# Patient Record
Sex: FEMALE | Race: WHITE | Employment: UNEMPLOYED | URBAN - METROPOLITAN AREA
[De-identification: names, ages, dates, MRNs, and addresses within clinical notes are randomized per-mention and may not be internally consistent; named-entity substitution may affect disease eponyms.]

---

## 2024-09-21 ENCOUNTER — HOSPITAL ENCOUNTER (EMERGENCY)
Age: 64
Discharge: HOME | End: 2024-09-22
Attending: EMERGENCY MEDICINE
Payer: MEDICAID

## 2024-09-21 ENCOUNTER — APPOINTMENT (OUTPATIENT)
Dept: GENERAL RADIOLOGY | Age: 64
End: 2024-09-21
Payer: MEDICAID

## 2024-09-21 DIAGNOSIS — S82.842A BIMALLEOLAR ANKLE FRACTURE, LEFT, CLOSED, INITIAL ENCOUNTER: Primary | ICD-10-CM

## 2024-09-21 PROCEDURE — 99284 EMERGENCY DEPT VISIT MOD MDM: CPT

## 2024-09-21 PROCEDURE — 6360000002 HC RX W HCPCS

## 2024-09-21 PROCEDURE — 73600 X-RAY EXAM OF ANKLE: CPT

## 2024-09-21 PROCEDURE — 96372 THER/PROPH/DIAG INJ SC/IM: CPT

## 2024-09-21 RX ADMIN — HYDROMORPHONE HYDROCHLORIDE 0.5 MG: 1 INJECTION, SOLUTION INTRAMUSCULAR; INTRAVENOUS; SUBCUTANEOUS at 23:19

## 2024-09-21 ASSESSMENT — LIFESTYLE VARIABLES
HOW MANY STANDARD DRINKS CONTAINING ALCOHOL DO YOU HAVE ON A TYPICAL DAY: PATIENT DOES NOT DRINK
HOW OFTEN DO YOU HAVE A DRINK CONTAINING ALCOHOL: NEVER

## 2024-09-21 ASSESSMENT — PAIN SCALES - GENERAL
PAINLEVEL_OUTOF10: 0
PAINLEVEL_OUTOF10: 4

## 2024-09-21 ASSESSMENT — PAIN - FUNCTIONAL ASSESSMENT: PAIN_FUNCTIONAL_ASSESSMENT: 0-10

## 2024-09-21 ASSESSMENT — PAIN DESCRIPTION - ORIENTATION: ORIENTATION: RIGHT

## 2024-09-21 ASSESSMENT — PAIN DESCRIPTION - LOCATION: LOCATION: ANKLE

## 2024-09-22 ENCOUNTER — APPOINTMENT (OUTPATIENT)
Dept: GENERAL RADIOLOGY | Age: 64
End: 2024-09-22
Payer: MEDICAID

## 2024-09-22 ENCOUNTER — APPOINTMENT (OUTPATIENT)
Dept: CT IMAGING | Age: 64
End: 2024-09-22
Payer: MEDICAID

## 2024-09-22 VITALS
DIASTOLIC BLOOD PRESSURE: 85 MMHG | SYSTOLIC BLOOD PRESSURE: 128 MMHG | HEART RATE: 89 BPM | OXYGEN SATURATION: 91 % | WEIGHT: 174 LBS | TEMPERATURE: 97.4 F | HEIGHT: 62 IN | RESPIRATION RATE: 16 BRPM | BODY MASS INDEX: 32.02 KG/M2

## 2024-09-22 LAB
25(OH)D3 SERPL-MCNC: 15.6 NG/ML (ref 30–100)
PREALB SERPL-MCNC: 20.2 MG/DL (ref 20–40)

## 2024-09-22 PROCEDURE — 82306 VITAMIN D 25 HYDROXY: CPT

## 2024-09-22 PROCEDURE — 6370000000 HC RX 637 (ALT 250 FOR IP)

## 2024-09-22 PROCEDURE — 73610 X-RAY EXAM OF ANKLE: CPT

## 2024-09-22 PROCEDURE — 73700 CT LOWER EXTREMITY W/O DYE: CPT

## 2024-09-22 PROCEDURE — 36415 COLL VENOUS BLD VENIPUNCTURE: CPT

## 2024-09-22 PROCEDURE — 2500000003 HC RX 250 WO HCPCS

## 2024-09-22 PROCEDURE — 84134 ASSAY OF PREALBUMIN: CPT

## 2024-09-22 RX ORDER — OXYCODONE AND ACETAMINOPHEN 5; 325 MG/1; MG/1
1 TABLET ORAL EVERY 6 HOURS PRN
Qty: 28 TABLET | Refills: 0 | Status: ON HOLD | OUTPATIENT
Start: 2024-09-22 | End: 2024-09-27

## 2024-09-22 RX ORDER — OXYCODONE AND ACETAMINOPHEN 5; 325 MG/1; MG/1
1 TABLET ORAL EVERY 6 HOURS PRN
Qty: 28 TABLET | Refills: 0 | Status: SHIPPED | OUTPATIENT
Start: 2024-09-22 | End: 2024-09-22

## 2024-09-22 RX ORDER — CELECOXIB 200 MG/1
200 CAPSULE ORAL 2 TIMES DAILY
Qty: 180 CAPSULE | Refills: 1 | Status: SHIPPED | OUTPATIENT
Start: 2024-09-22

## 2024-09-22 RX ORDER — ONDANSETRON 4 MG/1
4 TABLET, ORALLY DISINTEGRATING ORAL ONCE
Status: COMPLETED | OUTPATIENT
Start: 2024-09-22 | End: 2024-09-22

## 2024-09-22 RX ORDER — CELECOXIB 200 MG/1
200 CAPSULE ORAL 2 TIMES DAILY
Qty: 180 CAPSULE | Refills: 1 | Status: SHIPPED | OUTPATIENT
Start: 2024-09-22 | End: 2024-09-22

## 2024-09-22 RX ORDER — LIDOCAINE HYDROCHLORIDE 10 MG/ML
20 INJECTION, SOLUTION EPIDURAL; INFILTRATION; INTRACAUDAL; PERINEURAL ONCE
Status: COMPLETED | OUTPATIENT
Start: 2024-09-22 | End: 2024-09-22

## 2024-09-22 RX ADMIN — ONDANSETRON 4 MG: 4 TABLET, ORALLY DISINTEGRATING ORAL at 00:31

## 2024-09-22 RX ADMIN — LIDOCAINE HYDROCHLORIDE 20 ML: 10 INJECTION, SOLUTION EPIDURAL; INFILTRATION; INTRACAUDAL; PERINEURAL at 01:55

## 2024-09-22 ASSESSMENT — PAIN SCALES - GENERAL: PAINLEVEL_OUTOF10: 4

## 2024-09-24 ENCOUNTER — OFFICE VISIT (OUTPATIENT)
Dept: PODIATRY | Age: 64
End: 2024-09-24
Payer: MEDICAID

## 2024-09-24 VITALS — BODY MASS INDEX: 32.02 KG/M2 | WEIGHT: 174 LBS | HEIGHT: 62 IN

## 2024-09-24 DIAGNOSIS — M79.604 PAIN IN BOTH LOWER EXTREMITIES: ICD-10-CM

## 2024-09-24 DIAGNOSIS — M79.605 PAIN IN BOTH LOWER EXTREMITIES: ICD-10-CM

## 2024-09-24 DIAGNOSIS — R60.0 EDEMA OF LOWER EXTREMITY: ICD-10-CM

## 2024-09-24 DIAGNOSIS — S82.841A CLOSED BIMALLEOLAR FRACTURE OF RIGHT ANKLE, INITIAL ENCOUNTER: Primary | ICD-10-CM

## 2024-09-24 PROCEDURE — 1123F ACP DISCUSS/DSCN MKR DOCD: CPT | Performed by: PODIATRIST

## 2024-09-24 PROCEDURE — 99204 OFFICE O/P NEW MOD 45 MIN: CPT | Performed by: PODIATRIST

## 2024-09-24 NOTE — PROGRESS NOTES
[facility] [place] PHYSICIANS [facility] PODIATRY  [address] [place], [place] [address]  Dept: [phone]    NEW PATIENT PROGRESS NOTE  Date of patient's visit: 9/24/2024  Patient's Name:  Shirin Hua YOB: 1960            No care team member to display        Chief Complaint   Patient presents with    New Patient    Ankle Injury     Right ankle fx         HPI:   Shirin Hua is a 64 y.o. female who presents to the office today complaining of right ankle fracture.  Symptoms began 3 day(s) ago. Patient relates pain is Present.  Pain is rated 8 out of 10 and is described as constant.  Treatments prior to today's visit include: visit to [facility] emergency room,splint.  Currently denies F/C/N/V. Pt's primary care physician is No primary care provider on file. last seen patient does not have a pcp at this time.     Allergies   Allergen Reactions    Morphine Palpitations       No past medical history on file.    Prior to Admission medications    Medication Sig Start Date End Date Taking? Authorizing Provider   oxyCODONE-acetaminophen (PERCOCET) 5-325 MG per tablet Take 1 tablet by mouth every 6 hours as needed for Pain for up to 7 days. Intended supply: 7 days. Take lowest dose possible to manage pain Max Daily Amount: 4 tablets 9/22/24 9/29/24 Yes Rammounjenifer, Adnan A, DPM   celecoxib (CELEBREX) 200 MG capsule Take 1 capsule by mouth 2 times daily 9/22/24  Yes Christine Adnan A, DPM       No past surgical history on file.    No family history on file.    Social History     Tobacco Use    Smoking status: Former     Types: Cigarettes    Smokeless tobacco: Never   Substance Use Topics    Alcohol use: Not on file       Review of Systems    Review of Systems:   History obtained from chart review and the patient  General ROS: negative for - chills, fatigue, fever, night sweats or weight gain  Constitutional: Negative for chills, diaphoresis, fatigue, fever and

## 2024-09-26 ENCOUNTER — ANESTHESIA EVENT (OUTPATIENT)
Dept: OPERATING ROOM | Age: 64
End: 2024-09-26

## 2024-09-26 RX ORDER — SCOLOPAMINE TRANSDERMAL SYSTEM 1 MG/1
1 PATCH, EXTENDED RELEASE TRANSDERMAL
Status: CANCELLED | OUTPATIENT
Start: 2024-09-26 | End: 2024-09-29

## 2024-09-27 ENCOUNTER — ANESTHESIA (OUTPATIENT)
Dept: OPERATING ROOM | Age: 64
End: 2024-09-27

## 2024-09-27 ENCOUNTER — HOSPITAL ENCOUNTER (OUTPATIENT)
Age: 64
Setting detail: OUTPATIENT SURGERY
Discharge: HOME | End: 2024-09-27
Attending: PODIATRIST | Admitting: PODIATRIST
Payer: MEDICAID

## 2024-09-27 ENCOUNTER — APPOINTMENT (OUTPATIENT)
Dept: GENERAL RADIOLOGY | Age: 64
End: 2024-09-27
Attending: PODIATRIST
Payer: MEDICAID

## 2024-09-27 VITALS
HEIGHT: 62 IN | WEIGHT: 174 LBS | DIASTOLIC BLOOD PRESSURE: 92 MMHG | BODY MASS INDEX: 32.02 KG/M2 | OXYGEN SATURATION: 91 % | TEMPERATURE: 97.3 F | HEART RATE: 91 BPM | RESPIRATION RATE: 14 BRPM | SYSTOLIC BLOOD PRESSURE: 136 MMHG

## 2024-09-27 DIAGNOSIS — S82.842A BIMALLEOLAR ANKLE FRACTURE, LEFT, CLOSED, INITIAL ENCOUNTER: ICD-10-CM

## 2024-09-27 PROCEDURE — 73610 X-RAY EXAM OF ANKLE: CPT

## 2024-09-27 PROCEDURE — 7100000011 HC PHASE II RECOVERY - ADDTL 15 MIN: Performed by: PODIATRIST

## 2024-09-27 PROCEDURE — 6360000002 HC RX W HCPCS: Performed by: ANESTHESIOLOGY

## 2024-09-27 PROCEDURE — 6360000002 HC RX W HCPCS: Performed by: NURSE ANESTHETIST, CERTIFIED REGISTERED

## 2024-09-27 PROCEDURE — 3600000005 HC SURGERY LEVEL 5 BASE: Performed by: PODIATRIST

## 2024-09-27 PROCEDURE — 2780000010 HC IMPLANT OTHER: Performed by: PODIATRIST

## 2024-09-27 PROCEDURE — 2580000003 HC RX 258: Performed by: NURSE ANESTHETIST, CERTIFIED REGISTERED

## 2024-09-27 PROCEDURE — 2720000010 HC SURG SUPPLY STERILE: Performed by: PODIATRIST

## 2024-09-27 PROCEDURE — 76942 ECHO GUIDE FOR BIOPSY: CPT | Performed by: STUDENT IN AN ORGANIZED HEALTH CARE EDUCATION/TRAINING PROGRAM

## 2024-09-27 PROCEDURE — 6370000000 HC RX 637 (ALT 250 FOR IP)

## 2024-09-27 PROCEDURE — 3700000001 HC ADD 15 MINUTES (ANESTHESIA): Performed by: PODIATRIST

## 2024-09-27 PROCEDURE — 3600000015 HC SURGERY LEVEL 5 ADDTL 15MIN: Performed by: PODIATRIST

## 2024-09-27 PROCEDURE — 2709999900 HC NON-CHARGEABLE SUPPLY: Performed by: PODIATRIST

## 2024-09-27 PROCEDURE — C1713 ANCHOR/SCREW BN/BN,TIS/BN: HCPCS | Performed by: PODIATRIST

## 2024-09-27 PROCEDURE — 7100000000 HC PACU RECOVERY - FIRST 15 MIN: Performed by: PODIATRIST

## 2024-09-27 PROCEDURE — 7100000001 HC PACU RECOVERY - ADDTL 15 MIN: Performed by: PODIATRIST

## 2024-09-27 PROCEDURE — 6360000002 HC RX W HCPCS: Performed by: STUDENT IN AN ORGANIZED HEALTH CARE EDUCATION/TRAINING PROGRAM

## 2024-09-27 PROCEDURE — 27814 TREATMENT OF ANKLE FRACTURE: CPT | Performed by: PODIATRIST

## 2024-09-27 PROCEDURE — 3700000000 HC ANESTHESIA ATTENDED CARE: Performed by: PODIATRIST

## 2024-09-27 PROCEDURE — 7100000010 HC PHASE II RECOVERY - FIRST 15 MIN: Performed by: PODIATRIST

## 2024-09-27 PROCEDURE — 2500000003 HC RX 250 WO HCPCS: Performed by: NURSE ANESTHETIST, CERTIFIED REGISTERED

## 2024-09-27 DEVICE — DISTAL LATERAL FIBULA PLATE, 4 HOLE
Type: IMPLANTABLE DEVICE | Site: ANKLE | Status: FUNCTIONAL
Brand: VARIAX

## 2024-09-27 DEVICE — LOCKING SCREW
Type: IMPLANTABLE DEVICE | Site: ANKLE | Status: FUNCTIONAL
Brand: VARIAX

## 2024-09-27 DEVICE — CANNULATED SCREW
Type: IMPLANTABLE DEVICE | Site: ANKLE | Status: FUNCTIONAL
Brand: ASNIS

## 2024-09-27 RX ORDER — SODIUM CHLORIDE 0.9 % (FLUSH) 0.9 %
5-40 SYRINGE (ML) INJECTION EVERY 12 HOURS SCHEDULED
Status: DISCONTINUED | OUTPATIENT
Start: 2024-09-27 | End: 2024-09-27 | Stop reason: HOSPADM

## 2024-09-27 RX ORDER — DIPHENHYDRAMINE HYDROCHLORIDE 50 MG/ML
INJECTION INTRAMUSCULAR; INTRAVENOUS
Status: DISCONTINUED | OUTPATIENT
Start: 2024-09-27 | End: 2024-09-27 | Stop reason: SDUPTHER

## 2024-09-27 RX ORDER — DEXAMETHASONE SODIUM PHOSPHATE 10 MG/ML
INJECTION, SOLUTION INTRAMUSCULAR; INTRAVENOUS
Status: DISCONTINUED | OUTPATIENT
Start: 2024-09-27 | End: 2024-09-27 | Stop reason: SDUPTHER

## 2024-09-27 RX ORDER — ROCURONIUM BROMIDE 10 MG/ML
INJECTION, SOLUTION INTRAVENOUS
Status: DISCONTINUED | OUTPATIENT
Start: 2024-09-27 | End: 2024-09-27 | Stop reason: SDUPTHER

## 2024-09-27 RX ORDER — SODIUM CHLORIDE 0.9 % (FLUSH) 0.9 %
5-40 SYRINGE (ML) INJECTION PRN
Status: DISCONTINUED | OUTPATIENT
Start: 2024-09-27 | End: 2024-09-27 | Stop reason: HOSPADM

## 2024-09-27 RX ORDER — SODIUM CHLORIDE 9 MG/ML
INJECTION, SOLUTION INTRAVENOUS PRN
Status: DISCONTINUED | OUTPATIENT
Start: 2024-09-27 | End: 2024-09-27 | Stop reason: HOSPADM

## 2024-09-27 RX ORDER — PROPOFOL 10 MG/ML
INJECTION, EMULSION INTRAVENOUS
Status: DISCONTINUED | OUTPATIENT
Start: 2024-09-27 | End: 2024-09-27 | Stop reason: SDUPTHER

## 2024-09-27 RX ORDER — SCOLOPAMINE TRANSDERMAL SYSTEM 1 MG/1
PATCH, EXTENDED RELEASE TRANSDERMAL
Status: COMPLETED
Start: 2024-09-27 | End: 2024-09-27

## 2024-09-27 RX ORDER — BUPIVACAINE HYDROCHLORIDE 5 MG/ML
INJECTION, SOLUTION EPIDURAL; INTRACAUDAL
Status: COMPLETED | OUTPATIENT
Start: 2024-09-27 | End: 2024-09-27

## 2024-09-27 RX ORDER — MIDAZOLAM HYDROCHLORIDE 2 MG/2ML
2 INJECTION, SOLUTION INTRAMUSCULAR; INTRAVENOUS
Status: COMPLETED | OUTPATIENT
Start: 2024-09-27 | End: 2024-09-27

## 2024-09-27 RX ORDER — OXYCODONE AND ACETAMINOPHEN 5; 325 MG/1; MG/1
1 TABLET ORAL ONCE
Status: CANCELLED | OUTPATIENT
Start: 2024-09-27

## 2024-09-27 RX ORDER — CEFAZOLIN SODIUM 2 G/50ML
SOLUTION INTRAVENOUS
Status: DISCONTINUED | OUTPATIENT
Start: 2024-09-27 | End: 2024-09-27 | Stop reason: SDUPTHER

## 2024-09-27 RX ORDER — LABETALOL HYDROCHLORIDE 5 MG/ML
10 INJECTION, SOLUTION INTRAVENOUS
Status: DISCONTINUED | OUTPATIENT
Start: 2024-09-27 | End: 2024-09-27 | Stop reason: HOSPADM

## 2024-09-27 RX ORDER — PHENYLEPHRINE HCL IN 0.9% NACL 1 MG/10 ML
SYRINGE (ML) INTRAVENOUS
Status: DISCONTINUED | OUTPATIENT
Start: 2024-09-27 | End: 2024-09-27 | Stop reason: SDUPTHER

## 2024-09-27 RX ORDER — NALOXONE HYDROCHLORIDE 0.4 MG/ML
INJECTION, SOLUTION INTRAMUSCULAR; INTRAVENOUS; SUBCUTANEOUS PRN
Status: DISCONTINUED | OUTPATIENT
Start: 2024-09-27 | End: 2024-09-27 | Stop reason: HOSPADM

## 2024-09-27 RX ORDER — ONDANSETRON 2 MG/ML
INJECTION INTRAMUSCULAR; INTRAVENOUS
Status: DISCONTINUED | OUTPATIENT
Start: 2024-09-27 | End: 2024-09-27 | Stop reason: SDUPTHER

## 2024-09-27 RX ORDER — OXYCODONE AND ACETAMINOPHEN 5; 325 MG/1; MG/1
TABLET ORAL
Status: COMPLETED
Start: 2024-09-27 | End: 2024-09-27

## 2024-09-27 RX ORDER — LIDOCAINE HYDROCHLORIDE 10 MG/ML
INJECTION, SOLUTION EPIDURAL; INFILTRATION; INTRACAUDAL; PERINEURAL
Status: DISCONTINUED | OUTPATIENT
Start: 2024-09-27 | End: 2024-09-27 | Stop reason: SDUPTHER

## 2024-09-27 RX ORDER — FENTANYL CITRATE 50 UG/ML
INJECTION, SOLUTION INTRAMUSCULAR; INTRAVENOUS
Status: DISCONTINUED | OUTPATIENT
Start: 2024-09-27 | End: 2024-09-27 | Stop reason: SDUPTHER

## 2024-09-27 RX ORDER — CEFAZOLIN 2 G/1
INJECTION, POWDER, FOR SOLUTION INTRAMUSCULAR; INTRAVENOUS
Status: DISCONTINUED
Start: 2024-09-27 | End: 2024-09-27 | Stop reason: HOSPADM

## 2024-09-27 RX ORDER — LIDOCAINE HYDROCHLORIDE 10 MG/ML
INJECTION, SOLUTION INFILTRATION; PERINEURAL
Status: COMPLETED
Start: 2024-09-27 | End: 2024-09-27

## 2024-09-27 RX ORDER — PROCHLORPERAZINE EDISYLATE 5 MG/ML
5 INJECTION INTRAMUSCULAR; INTRAVENOUS
Status: COMPLETED | OUTPATIENT
Start: 2024-09-27 | End: 2024-09-27

## 2024-09-27 RX ORDER — FENTANYL CITRATE 50 UG/ML
100 INJECTION, SOLUTION INTRAMUSCULAR; INTRAVENOUS
Status: COMPLETED | OUTPATIENT
Start: 2024-09-27 | End: 2024-09-27

## 2024-09-27 RX ORDER — SODIUM CHLORIDE, SODIUM LACTATE, POTASSIUM CHLORIDE, CALCIUM CHLORIDE 600; 310; 30; 20 MG/100ML; MG/100ML; MG/100ML; MG/100ML
INJECTION, SOLUTION INTRAVENOUS
Status: DISCONTINUED | OUTPATIENT
Start: 2024-09-27 | End: 2024-09-27 | Stop reason: SDUPTHER

## 2024-09-27 RX ORDER — HYDRALAZINE HYDROCHLORIDE 20 MG/ML
10 INJECTION INTRAMUSCULAR; INTRAVENOUS
Status: DISCONTINUED | OUTPATIENT
Start: 2024-09-27 | End: 2024-09-27 | Stop reason: HOSPADM

## 2024-09-27 RX ORDER — OXYCODONE AND ACETAMINOPHEN 5; 325 MG/1; MG/1
1 TABLET ORAL EVERY 6 HOURS PRN
Qty: 20 TABLET | Refills: 0 | Status: SHIPPED | OUTPATIENT
Start: 2024-09-27 | End: 2024-10-02

## 2024-09-27 RX ORDER — BUPIVACAINE HYDROCHLORIDE 5 MG/ML
INJECTION, SOLUTION EPIDURAL; INTRACAUDAL
Status: COMPLETED
Start: 2024-09-27 | End: 2024-09-27

## 2024-09-27 RX ADMIN — OXYCODONE HYDROCHLORIDE AND ACETAMINOPHEN 1 TABLET: 5; 325 TABLET ORAL at 13:59

## 2024-09-27 RX ADMIN — SODIUM CHLORIDE, POTASSIUM CHLORIDE, SODIUM LACTATE AND CALCIUM CHLORIDE: 600; 310; 30; 20 INJECTION, SOLUTION INTRAVENOUS at 11:18

## 2024-09-27 RX ADMIN — ROCURONIUM BROMIDE 40 MG: 10 INJECTION, SOLUTION INTRAVENOUS at 11:21

## 2024-09-27 RX ADMIN — MIDAZOLAM HYDROCHLORIDE 2 MG: 1 INJECTION, SOLUTION INTRAMUSCULAR; INTRAVENOUS at 10:02

## 2024-09-27 RX ADMIN — Medication 12.5 MG: at 12:30

## 2024-09-27 RX ADMIN — PROPOFOL 200 MG: 10 INJECTION, EMULSION INTRAVENOUS at 11:21

## 2024-09-27 RX ADMIN — CEFAZOLIN SODIUM 2000 MG: 2 SOLUTION INTRAVENOUS at 11:24

## 2024-09-27 RX ADMIN — FENTANYL CITRATE 100 MCG: 50 INJECTION, SOLUTION INTRAMUSCULAR; INTRAVENOUS at 10:02

## 2024-09-27 RX ADMIN — LIDOCAINE HYDROCHLORIDE 50 MG: 10 INJECTION, SOLUTION EPIDURAL; INFILTRATION; INTRACAUDAL; PERINEURAL at 11:21

## 2024-09-27 RX ADMIN — Medication 100 MCG: at 11:34

## 2024-09-27 RX ADMIN — SODIUM CHLORIDE, POTASSIUM CHLORIDE, SODIUM LACTATE AND CALCIUM CHLORIDE: 600; 310; 30; 20 INJECTION, SOLUTION INTRAVENOUS at 12:51

## 2024-09-27 RX ADMIN — FENTANYL CITRATE 100 MCG: 50 INJECTION, SOLUTION INTRAMUSCULAR; INTRAVENOUS at 11:20

## 2024-09-27 RX ADMIN — BUPIVACAINE HYDROCHLORIDE 10 ML: 5 INJECTION, SOLUTION EPIDURAL; INTRACAUDAL at 10:09

## 2024-09-27 RX ADMIN — DEXAMETHASONE SODIUM PHOSPHATE 10 MG: 10 INJECTION, SOLUTION INTRAMUSCULAR; INTRAVENOUS at 11:35

## 2024-09-27 RX ADMIN — ONDANSETRON 4 MG: 2 INJECTION INTRAMUSCULAR; INTRAVENOUS at 12:30

## 2024-09-27 RX ADMIN — BUPIVACAINE HYDROCHLORIDE 20 ML: 5 INJECTION, SOLUTION EPIDURAL; INTRACAUDAL; PERINEURAL at 10:05

## 2024-09-27 RX ADMIN — PROCHLORPERAZINE EDISYLATE 5 MG: 5 INJECTION INTRAMUSCULAR; INTRAVENOUS at 13:06

## 2024-09-27 RX ADMIN — Medication 200 MCG: at 11:31

## 2024-09-27 ASSESSMENT — PAIN DESCRIPTION - DESCRIPTORS
DESCRIPTORS: ACHING
DESCRIPTORS: BURNING;STABBING

## 2024-09-27 ASSESSMENT — PAIN DESCRIPTION - LOCATION
LOCATION: ANKLE

## 2024-09-27 ASSESSMENT — PAIN DESCRIPTION - ORIENTATION
ORIENTATION: RIGHT

## 2024-09-27 ASSESSMENT — PAIN DESCRIPTION - PAIN TYPE
TYPE: SURGICAL PAIN

## 2024-09-27 ASSESSMENT — PAIN SCALES - GENERAL
PAINLEVEL_OUTOF10: 9
PAINLEVEL_OUTOF10: 7
PAINLEVEL_OUTOF10: 1
PAINLEVEL_OUTOF10: 9
PAINLEVEL_OUTOF10: 6

## 2024-09-27 ASSESSMENT — PAIN - FUNCTIONAL ASSESSMENT
PAIN_FUNCTIONAL_ASSESSMENT: NONE - DENIES PAIN
PAIN_FUNCTIONAL_ASSESSMENT: 0-10
PAIN_FUNCTIONAL_ASSESSMENT: PREVENTS OR INTERFERES WITH MANY ACTIVE NOT PASSIVE ACTIVITIES

## 2024-09-27 ASSESSMENT — COPD QUESTIONNAIRES: CAT_SEVERITY: MILD

## 2024-09-27 NOTE — OP NOTE
PODIATRY OP NOTE    PATIENT NAME: Shirin Hua  YOB: 1960  -  64 y.o. female  MRN: [id number]  DATE: 9/27/2024  BILLING #: [id number]    Surgeon(s):  Nikolay Martínez DPM     ASSISTANTS: FLORESITA Andrade DPM    PRE-OP DIAGNOSIS:   Closed nondisplaced bimalleolar fracture, right ankle    POST-OP DIAGNOSIS: Same as above.    PROCEDURE:   Open reduction internal fixation of fibula, right  Open reduction internal fixation of tibia, right    ANESTHESIA: General and popliteal block    HEMOSTASIS: Pneumatic tourniquet to right thigh@300 mmHg for 70 minutes.    ESTIMATED BLOOD LOSS: Minimal    MATERIALS:   Implant Name Type Inv. Item Serial No.  Lot No. LRB No. Used Action   [id number] Dart Fire Edge Cannulated Screw     [id number] Right 1 Implanted   [id number] DartFire Edge Cannulated Scerw     [id number] Right 1 Implanted   SCREW BNE L22MM DIA3MM THRD L5MM NONSTERILE MARY GURJIT HND FT - [id number]  SCREW BNE L22MM DIA3MM THRD L5MM NONSTERILE MARY GURJIT HND FT  [employer] HOW-  Right 1 Implanted   SCREW BNE L14MM DIA3.5MM CANC TI ROSAS FULL THRD T10 DRV FOR - [id number]  SCREW BNE L14MM DIA3.5MM CANC TI ROSAS FULL THRD T10 DRV FOR  [employer] HOW-  Right 5 Implanted   SCREW BNE L16MM DIA3.5MM GURJIT TI ROSAS FULL THRD T10 DRV FOR - [id number]  SCREW BNE L16MM DIA3.5MM GURJIT TI ROSAS FULL THRD T10 DRV FOR  [employer] HOW-  Right 1 Implanted   PLATE BNE L89MM THK1.3MM-2MM 4 H LAT DST FIBULAR TI STR ROSAS - [id number]  PLATE BNE L89MM THK1.3MM-2MM 4 H LAT DST FIBULAR TI STR ROSAS  [employer] HOW-  Right 1 Implanted       INJECTABLES: None    SPECIMEN:   * No specimens in log *    COMPLICATIONS: None    FINDINGS: Open reduction with internal fixation of right ankle distal fibula and medial malleolar fractures.  Stress testing for disruption of the syndesmosis was negative.    INDICATIONS FOR PROCEDURE: The patient is a 64-year-old female who presented

## 2024-09-27 NOTE — H&P
Foot and Ankle SURGERY CONSULTATION  [facility]        PATIENT NAME: Shirin Hua   YOB: 1960  GENDER: female     Procedure Date: 9/27/2024  8:54 AM     Attending Provider: Nikolay Martínez DPM        Chief Complaint: right ankle pain     Procedure Scheduled: right ankle sera orif with possible syndesmosis fixation     History of present Illness:  The patient is a 64 y.o. female  who presents to pre-op area prior to scheduled area with right ankle fracture.      Pt last seen in office on 9/24/2024 w/ c/o right ankle pan .   Pt recommended to undergo right ankle ORIF  at earliest possible convenience.    Pt denies changes to his medical history since he was last seen in office. Denies current nausea, vomiting, chest pain, SOB, fever, and chills.     Consent form signed in office reviewed w/ pt. Any/all additional questions/concerns were addressed and consent form updated w/ current date.  Pt elected to pursue the above as scheduled for today.     Past Medical History:  has no past medical history on file.    Past Surgical History:   Past Surgical History:   Procedure Laterality Date    TUBAL LIGATION         Social History:  reports that she has quit smoking. Her smoking use included cigarettes. She has never used smokeless tobacco. She reports that she does not currently use alcohol.    Family History: family history is not on file.    Review of Systems:   Negative except as listed above    Allergies: Morphine    Current Meds:  Current Facility-Administered Medications:     sodium chloride flush 0.9 % injection 5-40 mL, 5-40 mL, IntraVENous, 2 times per day, Daniela Elizondo MD    sodium chloride flush 0.9 % injection 5-40 mL, 5-40 mL, IntraVENous, PRN, Daniela Elizondo MD    0.9 % sodium chloride infusion, , IntraVENous, PRN, Daniela Elizondo MD    ceFAZolin (ANCEF) 2 g injection, , , ,     Vital Signs:  Vitals:    09/27/24 1024   BP: (!) 149/98   Pulse: 99   Resp: 20   Temp:    SpO2: 98%       Physical

## 2024-09-27 NOTE — BRIEF OP NOTE
PODIATRY OP NOTE    PATIENT NAME: Shirin Hua  YOB: 1960  -  64 y.o. female  MRN: [id number]  DATE: 9/27/2024  BILLING #: [id number]    Surgeon(s):  Nikolay Martínez DPM     ASSISTANTS: FLORESTIA Andrade DPM    PRE-OP DIAGNOSIS:   Closed nondisplaced bimalleolar fracture, right ankle    POST-OP DIAGNOSIS: Same as above.    PROCEDURE:   Open reduction internal fixation of fibula, right  Open reduction internal fixation of tibia, right    ANESTHESIA: General and popliteal block    HEMOSTASIS: Pneumatic tourniquet to right thigh@300 mmHg for 70 minutes.    ESTIMATED BLOOD LOSS: Minimal    MATERIALS:   Implant Name Type Inv. Item Serial No.  Lot No. LRB No. Used Action   [id number] Dart Fire Edge Cannulated Screw     [id number] Right 1 Implanted   [id number] DartFire Edge Cannulated Scerw     [id number] Right 1 Implanted   SCREW BNE L22MM DIA3MM THRD L5MM NONSTERILE MARY GURJIT HND FT - [id number]  SCREW BNE L22MM DIA3MM THRD L5MM NONSTERILE MARY GURJIT HND FT  [employer] HOW-  Right 1 Implanted   SCREW BNE L14MM DIA3.5MM CANC TI ROSAS FULL THRD T10 DRV FOR - [id number]  SCREW BNE L14MM DIA3.5MM CANC TI ROSAS FULL THRD T10 DRV FOR  [employer] HOW-  Right 5 Implanted   SCREW BNE L16MM DIA3.5MM GURJIT TI ROSAS FULL THRD T10 DRV FOR - [id number]  SCREW BNE L16MM DIA3.5MM GURJIT TI ROSAS FULL THRD T10 DRV FOR  [employer] HOW-  Right 1 Implanted   PLATE BNE L89MM THK1.3MM-2MM 4 H LAT DST FIBULAR TI STR ROSAS - [id number]  PLATE BNE L89MM THK1.3MM-2MM 4 H LAT DST FIBULAR TI STR ROSAS  [employer] HOW-  Right 1 Implanted       INJECTABLES: None    SPECIMEN:   * No specimens in log *    COMPLICATIONS: None    FINDINGS: Open reduction with internal fixation of right ankle distal fibula and medial malleolar fractures.  Stress testing for disruption of the syndesmosis was negative.    INDICATIONS FOR PROCEDURE: The patient is a 64-year-old female who presented

## 2024-09-27 NOTE — DISCHARGE INSTRUCTIONS
Podiatric Post Operative Instructions:  You have had a surgical procedure on your right foot.      Fluids and Diet:  Begin with clear liquids, broth, dry toast, and crackers.  If not nauseated then resume your regular pre-operative diet when you are ready    Medications:  Take your prescriptions as directed  You are receiving new prescriptions for the pain medication Percocet  You received popliteal block (nerve block in the back of knee)- this should manage your pain for the first 18hrs post operatively  If your pain is not severe then you may take the non-prescription medication that you normally take for aches and pains ie Tylenol and Ibuprofen (alternating), or if severe pain occurs these will serve as additional medication in conjuction with the Percocet  You may resume your regularly scheduled medications (unless otherwise directed)  If any side effects or adverse reactions occur, discontinue the medication and contact your doctor.  Review the patient drug information that is provided before you take any medication    Ambulation and Activity:  You are advised to go directly home from the hospital  Use crutches or walker as needed  We recommend knee scooter if possible, you can also obtain these on [employer] for rather affordable and quickly obtainable.  You may not put weight on the operated foot. Do not walk on the right foot.  Avoid stairs.  Do not lift or move heavy objects  Do not drive until cleared by your physician    Bandage and Wound Care Instructions:  Keep bandage clean and dry  Do NOT remove dressing/ splint  DO NOT get wet  Please use shower cover around leg if you do shower so that dressing does not get wet  Do not shower or bathe the operative extremity  Do not remove the bandage (unless otherwise directed)   Do not attempt to put anything between the cast or dressing and your skin, some itching is normal.    Ice and Elevation:  Elevate operative extremity as much as possible to reduce

## 2024-09-27 NOTE — BRIEF OP NOTE
PODIATRY BRIEF OP NOTE    PATIENT NAME: Shirin Hua  YOB: 1960  -  64 y.o. female  MRN: [id number]  DATE: 9/27/2024  BILLING #: [id number]    Surgeon(s):  Nikolay Martínez DPM     ASSISTANTS: FLORESITA Andrade DPM    PRE-OP DIAGNOSIS:   Closed nondisplaced bimalleolar fracture, right ankle    POST-OP DIAGNOSIS: Same as above.    PROCEDURE:   Open reduction internal fixation of fibula, right  Open reduction internal fixation of tibia, right    ANESTHESIA: General and popliteal block    HEMOSTASIS: Pneumatic tourniquet to right thigh@300 mmHg for 70 minutes.    ESTIMATED BLOOD LOSS: Minimal    MATERIALS:   Implant Name Type Inv. Item Serial No.  Lot No. LRB No. Used Action   [id number] Dart Fire Edge Cannulated Screw     [id number] Right 1 Implanted   [id number] DartFire Edge Cannulated Scerw     [id number] Right 1 Implanted   SCREW BNE L22MM DIA3MM THRD L5MM NONSTERILE MARY GURJIT HND FT - [id number]  SCREW BNE L22MM DIA3MM THRD L5MM NONSTERILE MARY GURJIT HND FT  [employer] [facility]-  Right 1 Implanted   SCREW BNE L14MM DIA3.5MM CANC TI ROSAS FULL THRD T10 DRV FOR - [id number]  SCREW BNE L14MM DIA3.5MM CANC TI ROSAS FULL THRD T10 DRV FOR  [employer] HOW-  Right 5 Implanted   SCREW BNE L16MM DIA3.5MM GURJIT TI ROSAS FULL THRD T10 DRV FOR - [id number]  SCREW BNE L16MM DIA3.5MM GURJIT TI ROSAS FULL THRD T10 DRV FOR  [employer] HOW-  Right 1 Implanted   PLATE BNE L89MM THK1.3MM-2MM 4 H LAT DST FIBULAR TI STR ROSAS - [id number]  PLATE BNE L89MM THK1.3MM-2MM 4 H LAT DST FIBULAR TI STR ROSAS  [employer] HOW-  Right 1 Implanted       INJECTABLES: None    SPECIMEN:   * No specimens in log *    COMPLICATIONS: None    FINDINGS: Open reduction with internal fixation of right ankle distal fibula and medial malleolar fractures.  Stress testing for disruption of the syndesmosis was negative.    Rickey Packer DPM   Podiatric Medicine & Surgery   9/27/2024 at

## 2024-09-30 ENCOUNTER — TELEPHONE (OUTPATIENT)
Dept: PODIATRY | Age: 64
End: 2024-09-30

## 2024-10-02 ENCOUNTER — OFFICE VISIT (OUTPATIENT)
Dept: PODIATRY | Age: 64
End: 2024-10-02

## 2024-10-02 VITALS — WEIGHT: 174 LBS | HEIGHT: 62 IN | BODY MASS INDEX: 32.02 KG/M2

## 2024-10-02 DIAGNOSIS — Z98.890 POST-OPERATIVE STATE: Primary | ICD-10-CM

## 2024-10-02 PROBLEM — M25.571 ACUTE RIGHT ANKLE PAIN: Status: ACTIVE | Noted: 2024-10-02

## 2024-10-02 PROBLEM — S82.841A CLOSED BIMALLEOLAR FRACTURE OF RIGHT ANKLE: Status: ACTIVE | Noted: 2024-10-02

## 2024-10-02 PROCEDURE — 99024 POSTOP FOLLOW-UP VISIT: CPT | Performed by: PODIATRIST

## 2024-10-02 RX ORDER — HYDROCODONE BITARTRATE AND ACETAMINOPHEN 5; 325 MG/1; MG/1
1 TABLET ORAL EVERY 6 HOURS PRN
Qty: 28 TABLET | Refills: 0 | Status: SHIPPED | OUTPATIENT
Start: 2024-10-02 | End: 2024-10-09

## 2024-10-07 NOTE — PROGRESS NOTES
[facility] PHYSICIANS [facility] PODIATRY  [address] [place], [place] [address]  Dept: [phone]    POST-OP PROGRESS NOTE  Date of patient's visit: 10/2/2024  Patient's Name:  Shirin Hua YOB: 1960            No care team member to display        Chief Complaint   Patient presents with    Post-Op Check         Subjective: Shirin Hua is a 64 y.o. female who presents to the office today 2week(s)  S/P right ankle open reduction internal fixation of by mall fracture for correction of ankle fracture  Problem List Items Addressed This Visit    None  Visit Diagnoses       Post-operative state    -  Primary    Relevant Medications    HYDROcodone-acetaminophen (NORCO) 5-325 MG per tablet        . Patient relates pain is Present and improve.  Pain is rated 3 out of 10 and is described as mild. Currently denies F/C/N/V.  Is patient taking pain medications as prescribed and is controlling pain  Relates the pain to the back of her heel    Physical Examination:  Incision is coapted, sutures/steri-strips are intact. Minimal bleeding post operatively. Edema present. No erythema. No Pus.   Operative correction is satisfactory.    Bruising to the back of the heel noted from the posterior spine    Radiographs: reviewed with patient show plates and screws across the ankle fracture for excellent ankle anatomical alignment      Assessment: Shirin Hua is status post as above  Normal post operative course.  Doing well          ICD-10-CM    1. Post-operative state  Z98.890 HYDROcodone-acetaminophen (NORCO) 5-325 MG per tablet            Plan:  Patient examined and evaluated.  Current condition and treatment options discussed in detail.  Advised pt to her condition.  Patient is able to be completely nonweightbearing in the scooter and the posterior splint was met done and padded to the back of her heel    Rx provided for Norco to take as directed  Patient is to

## 2024-10-16 ENCOUNTER — OFFICE VISIT (OUTPATIENT)
Dept: PODIATRY | Age: 64
End: 2024-10-16

## 2024-10-16 VITALS — HEIGHT: 62 IN | BODY MASS INDEX: 32.02 KG/M2 | WEIGHT: 174 LBS

## 2024-10-16 DIAGNOSIS — Z98.890 POST-OPERATIVE STATE: Primary | ICD-10-CM

## 2024-10-16 PROCEDURE — 99024 POSTOP FOLLOW-UP VISIT: CPT | Performed by: PODIATRIST

## 2024-10-16 NOTE — PROGRESS NOTES
[facility] PODIATRY  [address] [place], [place] [address]  Dept: [phone]    POST-OP PROGRESS NOTE  Date of patient's visit: 10/16/2024  Patient's Name:  Shirin Hua YOB: 1960            No care team member to display        Chief Complaint   Patient presents with    Post-Op Check     Post op x 2.5 weeks       Pt's primary care physician is No primary care provider on file. last seen patient does not have a pcp at this time     Subjective: Shirin Hua is a 64 y.o. female who presents to the office today 2.5 week(s)  S/P RIGHT ANKLE OPEN REDUCTION INTERNAL FIXATION WITH BRIANNA, RIGHT ANKLE SYNDESMOSIS LIGAMENT OPEN REDUCTION INTERNAL FIXATION WITH BRIANNA   for correction of Syndesmotic disruption of ankle, left, Closed nondisplaced bimalleolar fracture of left ankle      Problem List Items Addressed This Visit    None  Visit Diagnoses       Post-operative state    -  Primary    Relevant Orders    XR ANKLE RIGHT (MIN 3 VIEWS)        . Patient relates pain is Absent  and IMPROVED.  Pain is rated 0 out of 10 and is described as none. Currently denies F/C/N/V.  Is patient taking pain medications as prescribed and is controlling pain yes    Physical Examination:  Incision is coapted, sutures/steri-strips are intact. Minimal bleeding post operatively. Edema present. No erythema. No Pus.   Operative correction is satisfactory.      Radiographs: 3 views right ankle      Assessment: Shirin Hua is status post RIGHT ANKLE OPEN REDUCTION INTERNAL FIXATION WITH BRIANNA, RIGHT ANKLE SYNDESMOSIS LIGAMENT OPEN REDUCTION INTERNAL FIXATION WITH BRIANNA   Normal post operative course.  Doing well          ICD-10-CM    1. Post-operative state  Z98.890 XR ANKLE RIGHT (MIN 3 VIEWS)            Plan:  Patient examined and evaluated.  Current condition and treatment options discussed in detail.  Advised pt to her condtion    A prefabricated

## 2024-11-13 ENCOUNTER — OFFICE VISIT (OUTPATIENT)
Dept: PODIATRY | Age: 64
End: 2024-11-13

## 2024-11-13 VITALS — BODY MASS INDEX: 32.02 KG/M2 | WEIGHT: 174 LBS | HEIGHT: 62 IN

## 2024-11-13 DIAGNOSIS — Z98.890 POST-OPERATIVE STATE: Primary | ICD-10-CM

## 2024-11-13 PROCEDURE — 99024 POSTOP FOLLOW-UP VISIT: CPT | Performed by: PODIATRIST

## 2024-11-13 NOTE — PROGRESS NOTES
[facility] PODIATRY  [address] [place], [place] [address]  Dept: [phone]    POST-OP PROGRESS NOTE  Date of patient's visit: 11/13/2024  Patient's Name:  Shirin Hua YOB: 1960            No care team member to display        Chief Complaint   Patient presents with    Post-Op Check     Post op x 7 weeks       Pt's primary care physician is No primary care provider on file. last seen patient does not have a pcp at this time     Subjective: Shirin Hua is a 64 y.o. female who presents to the office today 7 week(s)  S/P RIGHT ANKLE OPEN REDUCTION INTERNAL FIXATION WITH BRIANNA, RIGHT ANKLE SYNDESMOSIS LIGAMENT OPEN REDUCTION INTERNAL FIXATION WITH BRIANNA   for correction of Syndesmotic disruption of ankle, left, Closed nondisplaced bimalleolar fracture of left ankle      Problem List Items Addressed This Visit    None  Visit Diagnoses       Post-operative state    -  Primary        . Patient relates pain is Present  and IMPROVED.  Pain is rated 1 out of 10 and is described as intermittent. Currently denies F/C/N/V.  Is patient taking pain medications as prescribed and is controlling pain yes    Physical Examination:  Incision is coapted, sutures/steri-strips are intact. Minimal bleeding post operatively. Edema present. No erythema. No Pus.   Operative correction is satisfactory.      Radiographs: 3 views right ankle healing fracture of the right ankle with no signs of hardware displacement      Assessment: Shirin Hua is status post RIGHT ANKLE OPEN REDUCTION INTERNAL FIXATION WITH BRIANNA, RIGHT ANKLE SYNDESMOSIS LIGAMENT OPEN REDUCTION INTERNAL FIXATION WITH BRIANNA   Normal post operative course.  Doing well          ICD-10-CM    1. Post-operative state  Z98.890             Plan:  Patient examined and evaluated.  Current condition and treatment options discussed in detail.  Advised pt to her condtion      The wound is

## 2024-11-20 ENCOUNTER — HOSPITAL ENCOUNTER (OUTPATIENT)
Dept: PHYSICAL THERAPY | Age: 64
Setting detail: THERAPIES SERIES
Discharge: HOME | End: 2024-11-20
Attending: PODIATRIST

## 2024-11-20 PROCEDURE — 97016 VASOPNEUMATIC DEVICE THERAPY: CPT

## 2024-11-20 PROCEDURE — 97162 PT EVAL MOD COMPLEX 30 MIN: CPT

## 2024-11-20 NOTE — THERAPY EVALUATION
[facility]   Outpatient Rehabilitation & Therapy  [address]         Phone: [phone]       Fax: [phone]    Physical Therapy Lower Extremity Evaluation    Date:  2024  Patient: Shirin Hua  : 1960  MRN: [id number]  Physician: Nikolay Martínez DO     Insurance: Pending Medicaid   Medical Diagnosis: Closed nondisplaced bimalleolar fracture of (R) ankle, Syndesmotic disruption of (R) ankle     Clinical Diagnosis: (R) ankle pain (M25.571) with decreased range of motion (M25.671) and walking difficulty (R26.2)  Onset date: 2024 Next 's appt.: TBD  Visit Count:    Cancel/No Show: 0/0    Subjective:   CC: (R) ankle pain   HPI: (2024) Patient is a 64 year old female who presented with (R) ankle pain due to a fall that occurred while walking down her steps. She stated that she missed the last step/acquired (R) ankle closed nondisplaced bimalleolar fracture. ORIF of the (R) ankle was performed on 2024  @ [facility]. Discharged to home same day with a cast x 2 weeks. Transitioned to a CAM boot until her last office visit on 2024. Based on the clinical findings, a referral for outpatient physical therapy was provided to further address the physical impairments and activity limitations. Currently WBAT with a lace up ankle brace. However, she arrived using a knee scooter due to walking being painful.      No past medical history on file.   Past Surgical History:   Procedure Laterality Date    ANKLE FRACTURE SURGERY Left 2024    RIGHT ANKLE OPEN REDUCTION INTERNAL FIXATION WITH BRIANNA performed by Nikolay Martínez DPM at [facility] OR    ANKLE SURGERY Left 2024    RIGHT ANKLE SYNDESMOSIS LIGAMENT OPEN REDUCTION INTERNAL FIXATION WITH BRIANNA performed by Nikolay Martínez DPM at [facility] OR    TUBAL LIGATION        Current Outpatient Medications   Medication Sig Dispense Refill

## 2024-11-22 NOTE — PROGRESS NOTES
[facility] Fall Risk Assessment    Risk Factor Scale  Score   History of Falls [x] Yes  [] No 25  0 25   Secondary Diagnosis [] Yes  [x] No 15  0 0   Ambulatory Aid [] Furniture  [x] Crutches/cane/walker  [] None/bedrest/wheelchair/nurse 30  15  0 15   IV/Heparin Lock [] Yes  [x] No 20  0 0   Gait/Transferring [x] Impaired  [] Weak  [] Normal/bedrest/immobile 20  10  0 20   Mental Status [] Forgets limitations  [x] Oriented to own ability 15  0 0      Total: 60     Based on the Assessment score: check the appropriate box.    []  No intervention needed   Low =   Score of 0-24    []  Use standard prevention interventions Moderate =  Score of 24-44   [] Give patient handout and discuss fall prevention strategies   [] Establish goal of education for patient/family RE: fall prevention strategies    [x]  Use high risk prevention interventions High = Score of 45 and higher   [x] Give patient handout and discuss fall prevention strategies   [x] Establish goal of education for patient/family Re: fall prevention strategies   [x] Discuss lifeline / other resources    Electronically signed by:   Narinder Cartagena PT DPT  Date: 11/20/2024

## 2024-12-02 ENCOUNTER — HOSPITAL ENCOUNTER (OUTPATIENT)
Dept: PHYSICAL THERAPY | Age: 64
Setting detail: THERAPIES SERIES
Discharge: HOME | End: 2024-12-02
Attending: PODIATRIST

## 2024-12-02 PROCEDURE — 97116 GAIT TRAINING THERAPY: CPT

## 2024-12-02 PROCEDURE — 97110 THERAPEUTIC EXERCISES: CPT

## 2024-12-02 PROCEDURE — 97016 VASOPNEUMATIC DEVICE THERAPY: CPT

## 2024-12-02 NOTE — FLOWSHEET NOTE
[facility]   Outpatient Rehabilitation & Therapy  [address]  Phone: [phone]  Fax: [phone]    Physical Therapy Daily Treatment Note    Date:  2024  Patient: Shirin Hua  : 1960  MRN: [id number]  Physician: Nikolay Martínez DO                    Insurance: Pending Medicaid   Medical Diagnosis: Closed nondisplaced bimalleolar fracture of (R) ankle, Syndesmotic disruption of (R) ankle             Clinical Diagnosis: (R) ankle pain (M25.571) with decreased range of motion (M25.671) and walking difficulty (R26.2)  Onset date: 2024         Next 's appt.: TBD  Visit Count:                                 Cancel/No Show: 0/0    Subjective  Patient stated that her pain levels have improved since evaluation. She stated she has been icing daily x 3 sessions and felt it has been definitely helping the pain. In addition, she has been able to walk with a RW for short distances but is still using her knee scooter for prolonged distances.     Pain  Pain:  [x] Yes   [] No       Location: (R) ankle joint with referred pain into the foot/toes   Pain Rating: (0-10 scale) 2/10 with pain medication   Worst: 2/10 with pain medication   Best: 2/10 with pain medication   Descriptors: constant, shooting, numb, burning, stabbing, sharp   Other:     Objective  Modalities: Vasopneumatic compression x 15 minutes to the (R) ankle - ankle sleeve, 34 degrees, low compressions (semi-reclined position)   Precautions: WBAT (R) LE   Exercises:  Exercise Reps/ Time Weight/ Level Comments   Long Seated Ankle Pumps  30 reps                              Passive Stretching   Long Seated (R) hamstring stretch with belt 30 sec hold x 3 reps   Long Seated (R) Inversion stretch 30 sec hold x 3 reps   Long Seated (R) Eversion stretch 30 sec hold x 3 reps   Long Seated (R) Plantar Flexion stretch 30 sec hold x 3 reps    Long Seated (R) Gastroc Stretch 30 sec hold x 3 reps     Gait Training  Single

## 2024-12-06 ENCOUNTER — HOSPITAL ENCOUNTER (OUTPATIENT)
Dept: PHYSICAL THERAPY | Age: 64
Setting detail: THERAPIES SERIES
Discharge: HOME | End: 2024-12-06
Attending: PODIATRIST

## 2024-12-06 PROCEDURE — 97016 VASOPNEUMATIC DEVICE THERAPY: CPT

## 2024-12-06 PROCEDURE — 97110 THERAPEUTIC EXERCISES: CPT

## 2024-12-06 NOTE — FLOWSHEET NOTE
[facility]   Outpatient Rehabilitation & Therapy  [address]  Phone: [phone]  Fax: [phone]    Physical Therapy Daily Treatment Note    Date:  2024  Patient: Shirin Hua  : 1960  MRN: [id number]  Physician: Nikolay Martínez DO                    Insurance: Pending Medicaid   Medical Diagnosis: Closed nondisplaced bimalleolar fracture of (R) ankle, Syndesmotic disruption of (R) ankle             Clinical Diagnosis: (R) ankle pain (M25.571) with decreased range of motion (M25.671) and walking difficulty (R26.2)  Onset date: 2024         Next 's appt.: TBD  Visit Count: 3/18                                Cancel/No Show: 0/0    Subjective  Patient stated that her pain levels continue to improve/felt the icing is really helping. She stated that she is now able to wear a normal shoe without pain/discomfort.     Pain  Pain:  [x] Yes   [] No       Location: (R) ankle joint with referred pain into the foot/toes   Pain Rating: (0-10 scale) 2/10 with pain medication   Worst: 0/10 with pain medication   Best: 2/10 with pain medication   Descriptors \"intermittent\"  shooting, numb, burning, stabbing, sharp   Other:     Objective  Modalities: Vasopneumatic compression x 15 minutes to the (R) ankle - ankle sleeve, 34 degrees, low compressions (semi-reclined position)   Precautions: WBAT (R) LE   Exercises:  Exercise Reps/ Time Weight/ Level Comments   Long Seated Ankle Pumps  30 reps      Long Seated (R) Ankle Inversion/Eversion  10 reps      Seated Rocker Board  30 reps   Forward/Backward, Side to Side    Seated Toe Yoga  20 reps            Passive Stretching   Long Seated (R) hamstring stretch with belt 30 sec hold x 3 reps   Long Seated (R) Inversion stretch 30 sec hold x 3 reps   Long Seated (R) Eversion stretch 30 sec hold x 3 reps   Long Seated (R) Plantar Flexion stretch 30 sec hold x 3 reps    Long Seated (R) Gastroc Stretch 30 sec hold x 3 reps     Specific

## 2024-12-09 ENCOUNTER — HOSPITAL ENCOUNTER (OUTPATIENT)
Dept: PHYSICAL THERAPY | Age: 64
Setting detail: THERAPIES SERIES
Discharge: HOME | End: 2024-12-09
Attending: PODIATRIST

## 2024-12-09 PROCEDURE — 97110 THERAPEUTIC EXERCISES: CPT

## 2024-12-09 PROCEDURE — 97016 VASOPNEUMATIC DEVICE THERAPY: CPT

## 2024-12-09 NOTE — FLOWSHEET NOTE
[facility]   Outpatient Rehabilitation & Therapy  [address]  Phone: [phone]  Fax: [phone]    Physical Therapy Daily Treatment Note    Date:  2024  Patient: Shirin Hua  : 1960  MRN: [id number]  Physician: Nikolay Martínez DO                    Insurance: Pending Medicaid   Medical Diagnosis: Closed nondisplaced bimalleolar fracture of (R) ankle, Syndesmotic disruption of (R) ankle             Clinical Diagnosis: (R) ankle pain (M25.571) with decreased range of motion (M25.671) and walking difficulty (R26.2)  Onset date: 2024         Next 's appt.: TBD  Visit Count:                                 Cancel/No Show: 0/0    Subjective  Patient reported that her pain levels have improved overall but admitted knee soreness from use of scooter to attend hockey game.  Stated has been walking in home level surfaces in home without scooter more confidently.    Pain  Pain:  [x] Yes   [] No       Location: (R) ankle joint with referred pain into the foot/toes   Pain Rating: (0-10 scale) 2/10 with pain medication   Worst: 0/10 with pain medication   Best: 2/10 with pain medication   Descriptors \"intermittent\"  shooting, numb, burning, stabbing, sharp   Other:     Objective  Modalities: Vasopneumatic compression x 15 minutes to the (R) ankle - ankle sleeve, 34 degrees, low compressions (semi-reclined position)   Precautions: WBAT (R) LE   Exercises:  Exercise Reps/ Time Weight/ Level Comments   Long Seated Ankle Pumps  30 reps      Long Seated (R) Ankle Inversion/Eversion  10 reps      Ankle Circles cw/ccw 10 reps     Seated Rocker Board  30 reps   Forward/Backward, Side to Side    Seated Toe Yoga  20 reps      Seated Mobo board  10 reps     Seated Yellow Half Ball 10 reps  cw/ccw   Passive Stretching   Long Seated (R) hamstring stretch with belt 30 sec hold x 3 reps   Long Seated (R) Inversion stretch 30 sec hold x 3 reps   Long Seated (R) Eversion stretch 30 sec

## 2024-12-11 ENCOUNTER — OFFICE VISIT (OUTPATIENT)
Dept: PODIATRY | Age: 64
End: 2024-12-11

## 2024-12-11 VITALS — HEIGHT: 62 IN | BODY MASS INDEX: 32.02 KG/M2 | WEIGHT: 174 LBS

## 2024-12-11 DIAGNOSIS — Z98.890 POST-OPERATIVE STATE: Primary | ICD-10-CM

## 2024-12-11 PROCEDURE — 99024 POSTOP FOLLOW-UP VISIT: CPT | Performed by: PODIATRIST

## 2024-12-11 NOTE — PROGRESS NOTES
[facility]  [address] [place], [place] [id number]  Dept: [phone]    POST-OP PROGRESS NOTE  Date of patient's visit: 12/11/2024  Patient's Name:  Shirin Hua YOB: 1960            No care team member to display        Chief Complaint   Patient presents with    Post-Op Check     Postop x 11 weeks       Pt's primary care physician is No primary care provider on file. last seen patient does not have a pcp at this time     Subjective: Shirin Hua is a 64 y.o. female who presents to the office today 11 week(s)  S/P RIGHT ANKLE OPEN REDUCTION INTERNAL FIXATION WITH BRIANNA, RIGHT ANKLE SYNDESMOSIS LIGAMENT OPEN REDUCTION INTERNAL FIXATION WITH BRIANNA   for correction of Syndesmotic disruption of ankle, left, Closed nondisplaced bimalleolar fracture of left ankle      Problem List Items Addressed This Visit    None  Visit Diagnoses       Post-operative state    -  Primary        . Patient relates pain is absent  and IMPROVED.  Pain is rated 0 out of 10 and is described as none. Currently denies F/C/N/V.  Is patient taking pain medications as prescribed and is controlling pain yes    Physical Examination:  Incision is coapted, sutures/steri-strips are intact. Minimal bleeding post operatively. Edema present. No erythema. No Pus.   Operative correction is satisfactory.      Radiographs: 3 views right ankle healing fracture of the right ankle with no signs of hardware displacement      Assessment: Shirin Hua is status post RIGHT ANKLE OPEN REDUCTION INTERNAL FIXATION WITH BRIANNA, RIGHT ANKLE SYNDESMOSIS LIGAMENT OPEN REDUCTION INTERNAL FIXATION WITH BRIANNA   Normal post operative course.  Doing well          ICD-10-CM    1. Post-operative state  Z98.890             Plan:  Patient examined and evaluated.  Current condition and treatment options discussed in detail.  Advised pt to her condtion     Verbal and written

## 2024-12-12 ENCOUNTER — APPOINTMENT (OUTPATIENT)
Dept: PHYSICAL THERAPY | Age: 64
End: 2024-12-12
Attending: PODIATRIST

## 2024-12-13 ENCOUNTER — HOSPITAL ENCOUNTER (OUTPATIENT)
Dept: PHYSICAL THERAPY | Age: 64
Setting detail: THERAPIES SERIES
Discharge: HOME | End: 2024-12-13
Attending: PODIATRIST

## 2024-12-13 PROCEDURE — 97016 VASOPNEUMATIC DEVICE THERAPY: CPT

## 2024-12-13 PROCEDURE — 97110 THERAPEUTIC EXERCISES: CPT

## 2024-12-13 NOTE — FLOWSHEET NOTE
hamstring stretch with belt 30 sec hold x 3 reps   Long Seated (R) Inversion stretch 30 sec hold x 3 reps   Long Seated (R) Eversion stretch 30 sec hold x 3 reps   Long Seated (R) Plantar Flexion stretch 30 sec hold x 3 reps    Long Seated (R) Gastroc Stretch 30 sec hold x 3 reps     Specific Instructions for next treatment: Progress R ankle and attempt to introduce WBing R ankle strengthening.  Review use of TB resistance for 4 way ankle strengthening.  Complete therapeutic exercise to include passive stretching until ROM/tightness is resolved. Progress open chain strengthening for the (R) ankle utilizing gravity neutral and gravity resisted positions.     Pt. Education:  [x] Yes  [] No  [] Reviewed Prior HEP/Ed  Method of Education: [x] Verbal  [x] Demo  [] Written  Comprehension of Education:  [] Verbalizes understanding.  [] Demonstrates understanding.  [x] Needs review.  [] Demonstrates/verbalizes HEP/Ed previously given.    Assessment  Continued with a therapeutic exercise program progressed adding 4 way ankle with Yellow with TB resist.  Ankle circles AROM, use of Mobo and rocker board to gain additional ankle ROM. Patient noted increased WBing tolerance with walking at home without device.  Will attempt to progress with WBing CKC Balance activity next visit. Finished treatment up with Vasopneumatic compression for edema control. Provided sample of Yellow TB to perform PREs with HEP.      Goals  MET NOT MET ON-  GOING  Details   STG: To be met in 9 treatments            1. ? Pain: Decrease pain levels to 2-5/10 when completing her desired ADLs.  []  []  []      2. ? ROM: Patient will demonstrate improved ROM by 5-10 degrees in all motions to assist with function. []  []  []      3. ? Strength: Increase strength in all involved  motions to 3/5  to ease functional limitations and mobility  []  []  []      4. Demonstrate knowledge of fall risk prevention  []  []  []      LTG: To be met in 18 treatments

## 2024-12-16 ENCOUNTER — HOSPITAL ENCOUNTER (OUTPATIENT)
Dept: PHYSICAL THERAPY | Age: 64
Setting detail: THERAPIES SERIES
Discharge: HOME | End: 2024-12-16
Attending: PODIATRIST

## 2024-12-16 PROCEDURE — 97016 VASOPNEUMATIC DEVICE THERAPY: CPT

## 2024-12-16 PROCEDURE — 97110 THERAPEUTIC EXERCISES: CPT

## 2024-12-16 PROCEDURE — 97116 GAIT TRAINING THERAPY: CPT

## 2024-12-16 NOTE — FLOWSHEET NOTE
[facility]   Outpatient Rehabilitation & Therapy  [address]  Phone: [phone]  Fax: [phone]    Physical Therapy Daily Treatment Note    Date:  2024  Patient: Shirin Hua  : 1960  MRN: [id number]  Physician: Nikolay Martínez DO                    Insurance: Pending Medicaid   Medical Diagnosis: Closed nondisplaced bimalleolar fracture of (R) ankle, Syndesmotic disruption of (R) ankle             Clinical Diagnosis: (R) ankle pain (M25.571) with decreased range of motion (M25.671) and walking difficulty (R26.2)  Onset date: 2024         Next 's appt.: TBD  Visit Count:                                 Cancel/No Show: 0/0    Subjective  Patient reporting to therapy noting symptoms are unchanged with numbness and tingling noted on dorsum of foot.      Pain  Pain:  [x] Yes   [] No       Location: (R) ankle joint with referred pain into the foot/toes   Pain Rating: (0-10 scale) 2/10 with pain medication   Worst: 0/10 with pain medication   Best: 2/10 with pain medication   Descriptors \"intermittent\"  shooting, numb, burning, stabbing, sharp   Other:     Objective  Modalities: Vasopneumatic compression x 15 minutes to the (R) ankle - ankle sleeve, 34 degrees, low compressions (semi-reclined position)   Precautions: WBAT (R) LE   Exercises: Bolded exercises performed 24  Exercise Reps/ Time Weight/ Level Comments   Long Seated Ankle Pumps  30 reps      Long Seated (R) Ankle Inversion/Eversion  20 reps      Ankle Circles cw/ccw 20 reps     Seated Rocker Board  30 reps   Forward/Backward, Side to Side    Seated Toe Yoga  20 reps      Seated Mobo board  30 reps     Seated Yellow Half Ball 15 reps  cw/ccw   Seated 4 way ankle 10 reps yellow          Standing      H/T Raise 10 reps  No shoes on   Pre-gait 10 reps  Working on heel strike into toe off to improve dorsiflexion in toe off.         Passive Stretching   Long Seated (R) hamstring stretch with belt 30

## 2024-12-20 ENCOUNTER — HOSPITAL ENCOUNTER (OUTPATIENT)
Dept: PHYSICAL THERAPY | Age: 64
Setting detail: THERAPIES SERIES
Discharge: HOME | End: 2024-12-20
Attending: PODIATRIST

## 2024-12-20 PROCEDURE — 97016 VASOPNEUMATIC DEVICE THERAPY: CPT

## 2024-12-20 PROCEDURE — 97110 THERAPEUTIC EXERCISES: CPT

## 2024-12-20 PROCEDURE — 97140 MANUAL THERAPY 1/> REGIONS: CPT

## 2024-12-20 NOTE — FLOWSHEET NOTE
[facility]   Outpatient Rehabilitation & Therapy  [address]  Phone: [phone]  Fax: [phone]    Physical Therapy Daily Treatment Note    Date:  2024  Patient: Shirin Hua  : 1960  MRN: [id number]  Physician: Nikolay Martínez DO                    Insurance: Pending Medicaid   Medical Diagnosis: Closed nondisplaced bimalleolar fracture of (R) ankle, Syndesmotic disruption of (R) ankle             Clinical Diagnosis: (R) ankle pain (M25.571) with decreased range of motion (M25.671) and walking difficulty (R26.2)  Onset date: 2024         Next 's appt.: TBD  Visit Count:                                 Cancel/No Show: 0/0    Subjective  Patient reported that numbness on dorsal aspect of R foot is persistant and very little change in SXs.  Expressed frustration stating ever since surgery numbness has been present and feel like R ankle is locked and will go no further.  Pain continues to rate at 2/10 but is minimal.  Patient will be out of town at beginning of the year.    Pain  Pain:  [x] Yes   [] No       Location: (R) ankle joint with referred pain into the foot/toes   Pain Rating: (0-10 scale) 2/10 with pain medication   Worst: 0/10 with pain medication   Best: 2/10 with pain medication   Descriptors \"intermittent\"  shooting, numb, burning, stabbing, sharp   Other:     Objective  Modalities: Vasopneumatic compression x 15 minutes to the (R) ankle - ankle sleeve, 34 degrees, low compressions (semi-reclined position)   Precautions: WBAT (R) LE   Exercises:   Exercise Reps/ Time Weight/ Level Completed Comments   Long Seated Ankle Pumps  30 reps   x    Long Seated (R) Ankle Inversion/Eversion  20 reps   x    Ankle Circles cw/ccw 20 reps  x    Seated Rocker Board  30 reps   x Forward/Backward, Side to Side    Seated Toe Yoga  20 reps   x    Seated Mobo board  30 reps  x    Seated Yellow Half Ball 15 reps  x cw/ccw   Seated 4 way ankle 10 reps Red x

## 2024-12-23 ENCOUNTER — APPOINTMENT (OUTPATIENT)
Dept: PHYSICAL THERAPY | Age: 64
End: 2024-12-23
Attending: PODIATRIST

## 2024-12-23 ENCOUNTER — HOSPITAL ENCOUNTER (OUTPATIENT)
Dept: PHYSICAL THERAPY | Age: 64
Setting detail: THERAPIES SERIES
Discharge: HOME | End: 2024-12-23
Attending: PODIATRIST

## 2024-12-23 PROCEDURE — 97110 THERAPEUTIC EXERCISES: CPT

## 2024-12-23 PROCEDURE — 97016 VASOPNEUMATIC DEVICE THERAPY: CPT

## 2024-12-23 NOTE — FLOWSHEET NOTE
hold x 3 reps   Long Seated (R) Eversion stretch 30 sec hold x 3 reps   Long Seated (R) Plantar Flexion stretch 30 sec hold x 3 reps    Long Standing (R) Gastroc Stretch 30 sec hold x 3 reps   Long Standing (R) Soleus Stretch 30 sec hold x 3 reps    Specific Instructions for next treatment: Continue with therapeutic exercise to passive stretching until the tightness is resolved. Progress with open/closed chain strengthening exercises for the (R) ankle/LE using gravity resisted and/or external resistance.     Pt. Education:  [] Yes  [] No  [x] Reviewed Prior HEP/Ed  Method of Education: [x] Verbal  [] Demo  [] Written    Comprehension of Education:  [] Verbalizes understanding.  [] Demonstrates understanding.  [] Needs review.  [x] Demonstrates/verbalizes HEP/Ed previously given.    Assessment  Tightness was still apparent within the (R) gastroc muscle. Improved frontal plane motion(s) were felt. Open/closed exercises were performed for the (R) ankle/LE. Good motor control was shown throughout the exercises. Followed up with Vasopneumatic compression due to the edema still being present compared to the (L) ankle/LE.     Goals  MET NOT MET ON-  GOING  Details   STG: To be met in 9 treatments            1. ? Pain: Decrease pain levels to 2-5/10 when completing her desired ADLs.  []  []  []      2. ? ROM: Patient will demonstrate improved ROM by 5-10 degrees in all motions to assist with function. []  []  []      3. ? Strength: Increase strength in all involved  motions to 3/5  to ease functional limitations and mobility  []  []  []      4. Demonstrate knowledge of fall risk prevention  []  []  []      LTG: To be met in 18 treatments           1. Improve score on functional assessment tool LEFS by 9 points.  []  []  []      2. No complaints of pain will be reported within her (R) ankle while performing her desired ADLs.  []  []  []      3. ? Strength: Increase strength in all involved motions to 4-5//5  to return to

## 2024-12-27 ENCOUNTER — HOSPITAL ENCOUNTER (OUTPATIENT)
Dept: PHYSICAL THERAPY | Age: 64
Setting detail: THERAPIES SERIES
Discharge: HOME | End: 2024-12-27
Attending: PODIATRIST

## 2024-12-27 NOTE — FLOWSHEET NOTE
[facility]   Outpatient Rehabilitation & Therapy  [address]  P: [phone]   F: [phone]     Physical Therapy Cancel/No Show note    Date: 2024  Patient: Shirin Hua  : 1960  MRN: [id number]    Visit Count:   Cancels/No Shows to date:     For today's appointment patient:    [x]  Cancelled    [] Rescheduled appointment    [] No-show     Reason given by patient:    []  Patient ill    []  Conflicting appointment    [] No transportation      [] Conflict with work    [] No reason given    [] Weather related    [] COVID-19    [x] Other:      Comments:  per FD patient canceled no reason and would call back to reschedule.  Patient has no future appts scheduled.      [] Next appointment was confirmed    Electronically signed by: Cheng Rowland PTA

## (undated) DEVICE — NEEDLE,22GX1.5",REG,BEVEL: Brand: MEDLINE

## (undated) DEVICE — PADDING CAST W6INXL4YD COT LO LINTING WYTEX

## (undated) DEVICE — 450 ML BOTTLE OF 0.05% CHLORHEXIDINE GLUCONATE IN 99.95% STERILE WATER FOR IRRIGATION, USP AND APPLICATOR.: Brand: IRRISEPT ANTIMICROBIAL WOUND LAVAGE

## (undated) DEVICE — DRAPE SURG W41XL74IN CLR FULL SZ C ARM 3 ADH POLY STRP E

## (undated) DEVICE — DRESSING PETRO W3XL3IN OIL EMUL N ADH GZ KNIT IMPREG CELOS

## (undated) DEVICE — K-WIRE: Brand: ASNIS

## (undated) DEVICE — SPONGE LAP W18XL18IN WHT COT 4 PLY FLD STRUNG RADPQ DISP ST 2 PER PACK

## (undated) DEVICE — PACK INST 4.0MM HEADLESS SCEW

## (undated) DEVICE — HOLDING PIN: Brand: ANCHORAGE

## (undated) DEVICE — PAD,ABDOMINAL,5"X9",ST,LF,25/BX: Brand: MEDLINE INDUSTRIES, INC.

## (undated) DEVICE — Device

## (undated) DEVICE — SUTURE MONOCRYL SZ 2-0 L27IN ABSRB UD SH L26MM TAPERPOINT NDL Y417H

## (undated) DEVICE — SOLUTION IRRIG 1000ML 0.9% SOD CHL USP POUR PLAS BTL

## (undated) DEVICE — DRILL BIT, AO DIA2.6MM X 135MM, SCALED: Brand: VARIAX

## (undated) DEVICE — APPLICATOR MEDICATED 26 CC SOLUTION HI LT ORNG CHLORAPREP

## (undated) DEVICE — SUTURE MONOCRYL SZ 3 0 L18IN ABSRB UD PS 2 3 8 CIR REV CUT NDL MCP497G

## (undated) DEVICE — COVER,C-ARM,41X74: Brand: MEDLINE

## (undated) DEVICE — TUBING, SUCTION, 9/32" X 20', STRAIGHT: Brand: MEDLINE INDUSTRIES, INC.

## (undated) DEVICE — BANDAGE GZ W2XL75IN ST RAYON POLY CNFRM STRTCH LTWT

## (undated) DEVICE — GOWN,SIRUS,NONRNF,XLN/XL,20/CS: Brand: MEDLINE

## (undated) DEVICE — 60-7070-103 TRNQT,DPSB,PLC RED: Brand: MEDLINE RENEWAL

## (undated) DEVICE — BLUNTFILL: Brand: MONOJECT

## (undated) DEVICE — CONTAINER,SPECIMEN,4OZ,OR STRL: Brand: MEDLINE

## (undated) DEVICE — SHEET,DRAPE,70X100,STERILE: Brand: MEDLINE

## (undated) DEVICE — GOWN,SIRUS,NONRNF,SETINSLV,XL,20/CS: Brand: MEDLINE

## (undated) DEVICE — TUBING, SUCTION, 3/16" X 10', STRAIGHT: Brand: MEDLINE

## (undated) DEVICE — ELECTRODE PT RET AD L9FT HI MOIST COND ADH HYDRGEL CORDED

## (undated) DEVICE — MHPB HAND AND FOOT PACK: Brand: MEDLINE INDUSTRIES, INC.

## (undated) DEVICE — GLOVE ORANGE PI 8   MSG9080

## (undated) DEVICE — STRAP,POSITIONING,KNEE/BODY,FOAM,4X60": Brand: MEDLINE

## (undated) DEVICE — C-ARMOR C-ARM EQUIPMENT COVERS CLEAR STERILE UNIVERSAL FIT 12 PER CASE: Brand: C-ARMOR

## (undated) DEVICE — DRAPE,EXTREMITY,89X128,STERILE: Brand: MEDLINE

## (undated) DEVICE — YANKAUER,FLEXIBLE HANDLE,REGLR CAPACITY: Brand: MEDLINE INDUSTRIES, INC.

## (undated) DEVICE — BLANKET WRM W29.9XL79.1IN UP BODY FORC AIR MISTRAL-AIR

## (undated) DEVICE — SUTURE PROL SZ 3-0 L18IN NONABSORBABLE BLU L24MM FS-1 3/8 8684G

## (undated) DEVICE — SPLINT QUICK STEP SCOTCHCAST 5 X 30

## (undated) DEVICE — BIT DRL DIA2.1MM AO CPL CANN DISP FOR 3MM SCR ASNS MIC SYS

## (undated) DEVICE — PADDING,UNDERCAST,COTTON, 4"X4YD STERILE: Brand: MEDLINE

## (undated) DEVICE — BNDG,ELSTC,MATRIX,STRL,4"X5YD,LF,HOOK&LP: Brand: MEDLINE

## (undated) DEVICE — GOWN,AURORA,NONREINFORCED,LARGE: Brand: MEDLINE

## (undated) DEVICE — GLOVE ORANGE PI 7 1/2   MSG9075

## (undated) DEVICE — BNDG,ELSTC,MATRIX,STRL,6"X5YD,LF,HOOK&LP: Brand: MEDLINE

## (undated) DEVICE — INTENDED FOR TISSUE SEPARATION, AND OTHER PROCEDURES THAT REQUIRE A SHARP SURGICAL BLADE TO PUNCTURE OR CUT.: Brand: BARD-PARKER ® CARBON RIB-BACK BLADES